# Patient Record
Sex: FEMALE | Race: BLACK OR AFRICAN AMERICAN
[De-identification: names, ages, dates, MRNs, and addresses within clinical notes are randomized per-mention and may not be internally consistent; named-entity substitution may affect disease eponyms.]

---

## 2020-10-16 ENCOUNTER — ERX REFILL RESPONSE (OUTPATIENT)
Age: 40
End: 2020-10-16

## 2020-10-16 RX ORDER — OMEPRAZOLE 40 MG/1
TAKE ONE CAPSULE BY MOUTH TWICE A DAY 30 MINUTES BEFORE FIRST AND LAST MEALS OF THE DAY CAPSULE, DELAYED RELEASE ORAL
Qty: 60 | Refills: 0

## 2020-11-17 ENCOUNTER — OFFICE VISIT (OUTPATIENT)
Dept: URBAN - METROPOLITAN AREA TELEHEALTH 2 | Facility: TELEHEALTH | Age: 40
End: 2020-11-17
Payer: SELF-PAY

## 2020-11-17 DIAGNOSIS — Z90.49 S/P CHOLECYSTECTOMY: ICD-10-CM

## 2020-11-17 DIAGNOSIS — K21.9 GERD: ICD-10-CM

## 2020-11-17 DIAGNOSIS — A04.8 H. PYLORI INFECTION: ICD-10-CM

## 2020-11-17 DIAGNOSIS — K76.0 FATTY LIVER: ICD-10-CM

## 2020-11-17 DIAGNOSIS — K31.84 GASTROPARESIS: ICD-10-CM

## 2020-11-17 DIAGNOSIS — Z98.84 STATUS POST GASTRIC BYPASS: ICD-10-CM

## 2020-11-17 PROCEDURE — 83013 H PYLORI (C-13) BREATH: CPT | Performed by: INTERNAL MEDICINE

## 2020-11-17 PROCEDURE — 83014 H PYLORI DRUG ADMIN: CPT | Performed by: INTERNAL MEDICINE

## 2020-11-17 PROCEDURE — 99214 OFFICE O/P EST MOD 30 MIN: CPT | Performed by: INTERNAL MEDICINE

## 2020-11-17 RX ORDER — OMEPRAZOLE 40 MG/1
TAKE ONE CAPSULE BY MOUTH TWICE A DAY 30 MINUTES BEFORE FIRST AND LAST MEALS OF THE DAY CAPSULE, DELAYED RELEASE ORAL
Qty: 60 | Refills: 0 | Status: ACTIVE | COMMUNITY

## 2020-11-17 RX ORDER — METRONIDAZOLE 500 MG/1
TAKE 1 TABLET (500 MG) BY ORAL ROUTE 3 TIMES PER DAY TABLET ORAL
Qty: 42 | Refills: 0 | Status: ACTIVE | COMMUNITY
Start: 2019-09-16 | End: 1900-01-01

## 2020-11-17 NOTE — HPI-OTHER HISTORIES
wt decr 43# over 1y since gastric bypass (191 total)  Rx'ed PPI/Cipro/Flagyl for Hpylori last yr  Epig pain less freq / severe  no further N/V, dyspepsia, dysphagia  Constipation alt w diarrhea - minor - usually regular and predictable  reviewed EGD from 2019, Colon from 2017

## 2021-08-30 ENCOUNTER — OFFICE VISIT (OUTPATIENT)
Dept: URBAN - METROPOLITAN AREA CLINIC 92 | Facility: CLINIC | Age: 41
End: 2021-08-30
Payer: SELF-PAY

## 2021-08-30 DIAGNOSIS — K21.9 GERD: ICD-10-CM

## 2021-08-30 DIAGNOSIS — Z90.49 S/P CHOLECYSTECTOMY: ICD-10-CM

## 2021-08-30 DIAGNOSIS — K31.84 GASTROPARESIS: ICD-10-CM

## 2021-08-30 DIAGNOSIS — Z98.84 STATUS POST GASTRIC BYPASS: ICD-10-CM

## 2021-08-30 DIAGNOSIS — K76.0 FATTY LIVER: ICD-10-CM

## 2021-08-30 DIAGNOSIS — A04.8 H. PYLORI INFECTION: ICD-10-CM

## 2021-08-30 PROCEDURE — 99214 OFFICE O/P EST MOD 30 MIN: CPT | Performed by: INTERNAL MEDICINE

## 2021-08-30 RX ORDER — OMEPRAZOLE 40 MG/1
TAKE ONE CAPSULE BY MOUTH TWICE A DAY 30 MINUTES BEFORE FIRST AND LAST MEALS OF THE DAY CAPSULE, DELAYED RELEASE ORAL TWICE DAILY
Qty: 180 | Refills: 3

## 2021-08-30 RX ORDER — OMEPRAZOLE 40 MG/1
TAKE ONE CAPSULE BY MOUTH TWICE A DAY 30 MINUTES BEFORE FIRST AND LAST MEALS OF THE DAY CAPSULE, DELAYED RELEASE ORAL
Qty: 60 | Refills: 0 | Status: ACTIVE | COMMUNITY

## 2021-08-30 RX ORDER — POLYETHYLENE GLYCOL 3350 17 G/17G
1 PACKET MIXED WITH 8 OUNCES OF FLUID POWDER, FOR SOLUTION ORAL TWICE A DAY
Qty: 180 | Refills: 3 | OUTPATIENT

## 2021-08-30 NOTE — HPI-OTHER HISTORIES
wt decr 4# over 9mo (was 159)  now w recurrent abd pain  burning, epigastric  Rx'ed PPI/Cipro/Flagyl for Hpylori 2y ago  no further N/V, dyspepsia, dysphagia  Constipation recurrence - can skip days w/o BM  reviewed EGD from 2019, Colon from 2017  recent partial hysterectomy c/b hematoma formation

## 2021-10-26 ENCOUNTER — OFFICE VISIT (OUTPATIENT)
Dept: URBAN - METROPOLITAN AREA CLINIC 92 | Facility: CLINIC | Age: 41
End: 2021-10-26

## 2021-11-17 LAB
ALBUMIN: 4.3
ALKALINE PHOSPHATASE: 67
ALT (SGPT): 8
AST (SGOT): 6
BILIRUBIN, DIRECT: 0.13
BILIRUBIN, TOTAL: 0.4
PROTEIN, TOTAL: 7.3

## 2021-11-19 ENCOUNTER — OFFICE VISIT (OUTPATIENT)
Dept: URBAN - METROPOLITAN AREA TELEHEALTH 2 | Facility: TELEHEALTH | Age: 41
End: 2021-11-19
Payer: SELF-PAY

## 2021-11-19 DIAGNOSIS — R63.4 WEIGHT LOSS: ICD-10-CM

## 2021-11-19 DIAGNOSIS — K21.9 GERD: ICD-10-CM

## 2021-11-19 DIAGNOSIS — A04.8 H. PYLORI INFECTION: ICD-10-CM

## 2021-11-19 DIAGNOSIS — K76.0 FATTY LIVER: ICD-10-CM

## 2021-11-19 DIAGNOSIS — K31.84 GASTROPARESIS: ICD-10-CM

## 2021-11-19 DIAGNOSIS — Z90.49 S/P CHOLECYSTECTOMY: ICD-10-CM

## 2021-11-19 PROCEDURE — 99214 OFFICE O/P EST MOD 30 MIN: CPT | Performed by: INTERNAL MEDICINE

## 2021-11-19 RX ORDER — OMEPRAZOLE 40 MG/1
TAKE ONE CAPSULE BY MOUTH TWICE A DAY 30 MINUTES BEFORE FIRST AND LAST MEALS OF THE DAY CAPSULE, DELAYED RELEASE ORAL TWICE DAILY
Qty: 180 | Refills: 3 | COMMUNITY

## 2021-11-19 RX ORDER — POLYETHYLENE GLYCOL 3350 17 G/17G
1 PACKET MIXED WITH 8 OUNCES OF FLUID POWDER, FOR SOLUTION ORAL TWICE A DAY
Qty: 180 | Refills: 3 | COMMUNITY

## 2021-11-19 RX ORDER — POLYETHYLENE GLYCOL 3350 17 G/17G
1 PACKET MIXED WITH 8 OUNCES OF FLUID POWDER, FOR SOLUTION ORAL TWICE A DAY
Qty: 180 | Refills: 3 | OUTPATIENT

## 2021-11-19 RX ORDER — OMEPRAZOLE 40 MG/1
TAKE ONE CAPSULE BY MOUTH TWICE A DAY 30 MINUTES BEFORE FIRST AND LAST MEALS OF THE DAY CAPSULE, DELAYED RELEASE ORAL TWICE DAILY
Qty: 180 | Refills: 3

## 2021-11-19 NOTE — HPI-OTHER HISTORIES
wt decr 10# over 3mo (was 155)  s/p gastric bypass surgery  RUQ US not done  seen in ER last wk, CT scan normal per report  now w recurrent abd pain  burning, epigastric, severe  Rx'ed PPI/Cipro/Flagyl for Hpylori 2y ago  no further N/V, dyspepsia, dysphagia  Constipation recurrence - can skip days w/o BM  reviewed EGD from 2019, Colon from 2017  recent partial hysterectomy c/b hematoma formation

## 2021-11-22 ENCOUNTER — CLAIMS CREATED FROM THE CLAIM WINDOW (OUTPATIENT)
Dept: URBAN - METROPOLITAN AREA CLINIC 4 | Facility: CLINIC | Age: 41
End: 2021-11-22
Payer: SELF-PAY

## 2021-11-22 ENCOUNTER — OFFICE VISIT (OUTPATIENT)
Dept: URBAN - METROPOLITAN AREA SURGERY CENTER 16 | Facility: SURGERY CENTER | Age: 41
End: 2021-11-22
Payer: SELF-PAY

## 2021-11-22 DIAGNOSIS — K29.60 ADENOPAPILLOMATOSIS GASTRICA: ICD-10-CM

## 2021-11-22 DIAGNOSIS — B96.81 BACTERIAL INFECTION DUE TO H. PYLORI: ICD-10-CM

## 2021-11-22 DIAGNOSIS — Z98.84 BARIATRIC SURG STAT-UNSP: ICD-10-CM

## 2021-11-22 DIAGNOSIS — B96.81 HELICOBACTER PYLORI [H. PYLORI] AS THE CAUSE OF DISEASES CLASSIFIED ELSEWHERE: ICD-10-CM

## 2021-11-22 DIAGNOSIS — K29.60 OTHER GASTRITIS WITHOUT BLEEDING: ICD-10-CM

## 2021-11-22 PROCEDURE — G8907 PT DOC NO EVENTS ON DISCHARG: HCPCS | Performed by: INTERNAL MEDICINE

## 2021-11-22 PROCEDURE — 88305 TISSUE EXAM BY PATHOLOGIST: CPT | Performed by: PATHOLOGY

## 2021-11-22 PROCEDURE — 88312 SPECIAL STAINS GROUP 1: CPT | Performed by: PATHOLOGY

## 2021-11-22 PROCEDURE — 43239 EGD BIOPSY SINGLE/MULTIPLE: CPT | Performed by: INTERNAL MEDICINE

## 2021-11-22 RX ORDER — OMEPRAZOLE 40 MG/1
TAKE ONE CAPSULE BY MOUTH TWICE A DAY 30 MINUTES BEFORE FIRST AND LAST MEALS OF THE DAY CAPSULE, DELAYED RELEASE ORAL TWICE DAILY
Qty: 180 | Refills: 3 | Status: ACTIVE | COMMUNITY

## 2021-11-22 RX ORDER — POLYETHYLENE GLYCOL 3350 17 G/17G
1 PACKET MIXED WITH 8 OUNCES OF FLUID POWDER, FOR SOLUTION ORAL TWICE A DAY
Qty: 180 | Refills: 3 | Status: ACTIVE | COMMUNITY

## 2021-12-08 PROBLEM — 442461003 HISTORY OF EXCISION OF INTESTINAL STRUCTURE: Status: ACTIVE | Noted: 2020-11-17

## 2021-12-08 PROBLEM — 91936005 PENICILLIN ALLERGY: Status: ACTIVE | Noted: 2021-12-08

## 2021-12-08 PROBLEM — 235675006 GASTROPARESIS: Status: ACTIVE | Noted: 2020-11-17

## 2021-12-08 PROBLEM — 197321007 FATTY LIVER: Status: ACTIVE | Noted: 2020-11-17

## 2021-12-10 ENCOUNTER — OFFICE VISIT (OUTPATIENT)
Dept: URBAN - METROPOLITAN AREA TELEHEALTH 2 | Facility: TELEHEALTH | Age: 41
End: 2021-12-10
Payer: SELF-PAY

## 2021-12-10 DIAGNOSIS — A04.8 H. PYLORI INFECTION: ICD-10-CM

## 2021-12-10 DIAGNOSIS — K21.9 GERD: ICD-10-CM

## 2021-12-10 DIAGNOSIS — K31.84 GASTROPARESIS: ICD-10-CM

## 2021-12-10 DIAGNOSIS — Z98.84 STATUS POST GASTRIC BYPASS: ICD-10-CM

## 2021-12-10 PROCEDURE — 99214 OFFICE O/P EST MOD 30 MIN: CPT | Performed by: INTERNAL MEDICINE

## 2021-12-10 RX ORDER — POLYETHYLENE GLYCOL 3350 17 G/17G
1 PACKET MIXED WITH 8 OUNCES OF FLUID POWDER, FOR SOLUTION ORAL TWICE A DAY
Qty: 180 | Refills: 3 | OUTPATIENT

## 2021-12-10 RX ORDER — OMEPRAZOLE 40 MG/1
TAKE ONE CAPSULE BY MOUTH TWICE A DAY 30 MINUTES BEFORE FIRST AND LAST MEALS OF THE DAY CAPSULE, DELAYED RELEASE ORAL TWICE DAILY
Qty: 180 | Refills: 3 | COMMUNITY

## 2021-12-10 RX ORDER — POLYETHYLENE GLYCOL 3350 17 G/17G
1 PACKET MIXED WITH 8 OUNCES OF FLUID POWDER, FOR SOLUTION ORAL TWICE A DAY
Qty: 180 | Refills: 3 | COMMUNITY

## 2021-12-10 RX ORDER — OMEPRAZOLE 40 MG/1
TAKE ONE CAPSULE BY MOUTH TWICE A DAY 30 MINUTES BEFORE FIRST AND LAST MEALS OF THE DAY CAPSULE, DELAYED RELEASE ORAL TWICE DAILY
Qty: 180 | Refills: 3

## 2021-12-10 NOTE — HPI-OTHER HISTORIES
wt stable# over 3wk (was 145)  11/22/21 EGD anatomy c/w s/p gastric bypass surgery bxs POSITIVE for hpylori gastritis  CT scan normal recently  on PPI/Miralax,   abd pain persistent; burning, epigastric, severe; has to be on a soft/vegan diet  no further N/V, dyspepsia, dysphagia  Constipation recurrence - can skip days w/o BM  recent partial hysterectomy c/b hematoma formation

## 2021-12-10 NOTE — PHYSICAL EXAM NECK/THYROID:
normal appearance, without tenderness upon palpation, no deformities, no cervical lymphadenopathy, no masses, no thyroid nodules, Thyroid normal size, no JVD, thyroid nontender
97

## 2021-12-22 ENCOUNTER — TELEPHONE ENCOUNTER (OUTPATIENT)
Dept: URBAN - METROPOLITAN AREA CLINIC 92 | Facility: CLINIC | Age: 41
End: 2021-12-22

## 2021-12-22 RX ORDER — POLYETHYLENE GLYCOL 3350 17 G/17G
1 PACKET MIXED WITH 8 OUNCES OF FLUID POWDER, FOR SOLUTION ORAL TWICE A DAY
Qty: 180 | Refills: 3 | Status: ACTIVE | COMMUNITY

## 2021-12-22 RX ORDER — OMEPRAZOLE 40 MG/1
TAKE ONE CAPSULE BY MOUTH TWICE A DAY 30 MINUTES BEFORE FIRST AND LAST MEALS OF THE DAY CAPSULE, DELAYED RELEASE ORAL TWICE DAILY
Qty: 180 | Refills: 3 | Status: ACTIVE | COMMUNITY

## 2021-12-22 RX ORDER — ONDANSETRON HYDROCHLORIDE 8 MG/1
1 TABLET AS NEEDED TABLET, FILM COATED ORAL
Qty: 90 | Refills: 1 | OUTPATIENT
Start: 2021-12-22

## 2023-07-14 ENCOUNTER — LAB OUTSIDE AN ENCOUNTER (OUTPATIENT)
Dept: URBAN - METROPOLITAN AREA TELEHEALTH 2 | Facility: TELEHEALTH | Age: 43
End: 2023-07-14

## 2023-07-14 ENCOUNTER — OFFICE VISIT (OUTPATIENT)
Dept: URBAN - METROPOLITAN AREA TELEHEALTH 2 | Facility: TELEHEALTH | Age: 43
End: 2023-07-14
Payer: SELF-PAY

## 2023-07-14 ENCOUNTER — TELEPHONE ENCOUNTER (OUTPATIENT)
Dept: URBAN - METROPOLITAN AREA CLINIC 92 | Facility: CLINIC | Age: 43
End: 2023-07-14

## 2023-07-14 VITALS — WEIGHT: 145 LBS | HEIGHT: 60 IN | BODY MASS INDEX: 28.47 KG/M2

## 2023-07-14 DIAGNOSIS — K76.0 FATTY LIVER: ICD-10-CM

## 2023-07-14 DIAGNOSIS — K21.9 GERD: ICD-10-CM

## 2023-07-14 DIAGNOSIS — K31.84 GASTROPARESIS: ICD-10-CM

## 2023-07-14 DIAGNOSIS — A04.8 BACTERIAL INFECTION DUE TO H. PYLORI: ICD-10-CM

## 2023-07-14 PROCEDURE — 99214 OFFICE O/P EST MOD 30 MIN: CPT | Performed by: INTERNAL MEDICINE

## 2023-07-14 RX ORDER — OMEPRAZOLE 40 MG/1
TAKE ONE CAPSULE BY MOUTH TWICE A DAY 30 MINUTES BEFORE FIRST AND LAST MEALS OF THE DAY CAPSULE, DELAYED RELEASE ORAL TWICE DAILY
Qty: 180 | Refills: 3

## 2023-07-14 RX ORDER — ONDANSETRON HYDROCHLORIDE 8 MG/1
1 TABLET AS NEEDED TABLET, FILM COATED ORAL
Qty: 90 | Refills: 1 | Status: ACTIVE | COMMUNITY
Start: 2021-12-22

## 2023-07-14 RX ORDER — OMEPRAZOLE 40 MG/1
TAKE ONE CAPSULE BY MOUTH TWICE A DAY 30 MINUTES BEFORE FIRST AND LAST MEALS OF THE DAY CAPSULE, DELAYED RELEASE ORAL TWICE DAILY
Qty: 180 | Refills: 3 | Status: ACTIVE | COMMUNITY

## 2023-07-14 RX ORDER — POLYETHYLENE GLYCOL 3350 17 G/17G
1 PACKET MIXED WITH 8 OUNCES OF FLUID POWDER, FOR SOLUTION ORAL TWICE A DAY
Qty: 180 | Refills: 3 | Status: ACTIVE | COMMUNITY

## 2023-07-14 NOTE — HPI-OTHER HISTORIES
42yoF, former patient of Dr. Strong, presents for fu, last seen 2021 She notes severe GERD. With all po intake has regurgitation and burning up her throat. Assoc nausea and gagging but no vomiting-she is s/p bypass.  She is currently on omeprazole without improvement in sx. Denies NSAID use. No dysphagia.  She has BMs every 1-2 days with intermittent diarrhea about 1-2/week for the last month. No hematochezia or melena. She denies dairy intake.   11/22/21 EGD: anatomy c/w s/p gastric bypass surgery, bxs POSITIVE for hpylori gastritis, + grade a esoph Colon 11/2016 WNL  GES 2018 slow emptying at 2hrs, normal at 3  Referred to [facility] Dr. Lemus who rec quad therapy as prior failure to cipro, flagyl, and a PPI (PCN allergy). F/U eradication testing ordered but no results in [facility]. Per patient ID told her that they failed to eradicate the bacteria and then she never followed-up.

## 2023-07-27 ENCOUNTER — OFFICE VISIT (OUTPATIENT)
Dept: URBAN - METROPOLITAN AREA SURGERY CENTER 16 | Facility: SURGERY CENTER | Age: 43
End: 2023-07-27
Payer: SELF-PAY

## 2023-07-27 ENCOUNTER — CLAIMS CREATED FROM THE CLAIM WINDOW (OUTPATIENT)
Dept: URBAN - METROPOLITAN AREA CLINIC 4 | Facility: CLINIC | Age: 43
End: 2023-07-27
Payer: SELF-PAY

## 2023-07-27 DIAGNOSIS — K31.89 OTHER DISEASES OF STOMACH AND DUODENUM: ICD-10-CM

## 2023-07-27 DIAGNOSIS — K31.89 ACQUIRED DEFORMITY OF DUODENUM: ICD-10-CM

## 2023-07-27 DIAGNOSIS — Z98.84 BARIATRIC SURG STAT-UNSP: ICD-10-CM

## 2023-07-27 DIAGNOSIS — R11.2 ACUTE NAUSEA WITH NONBILIOUS VOMITING: ICD-10-CM

## 2023-07-27 PROCEDURE — 88305 TISSUE EXAM BY PATHOLOGIST: CPT | Performed by: PATHOLOGY

## 2023-07-27 PROCEDURE — 43239 EGD BIOPSY SINGLE/MULTIPLE: CPT | Performed by: INTERNAL MEDICINE

## 2023-07-27 PROCEDURE — 88342 IMHCHEM/IMCYTCHM 1ST ANTB: CPT | Performed by: PATHOLOGY

## 2023-08-30 ENCOUNTER — DASHBOARD ENCOUNTERS (OUTPATIENT)
Age: 43
End: 2023-08-30

## 2023-08-31 ENCOUNTER — CLAIMS CREATED FROM THE CLAIM WINDOW (OUTPATIENT)
Dept: URBAN - METROPOLITAN AREA TELEHEALTH 2 | Facility: TELEHEALTH | Age: 43
End: 2023-08-31
Payer: SELF-PAY

## 2023-08-31 ENCOUNTER — TELEPHONE ENCOUNTER (OUTPATIENT)
Dept: URBAN - METROPOLITAN AREA CLINIC 92 | Facility: CLINIC | Age: 43
End: 2023-08-31

## 2023-08-31 VITALS — WEIGHT: 145 LBS | HEIGHT: 60 IN | BODY MASS INDEX: 28.47 KG/M2

## 2023-08-31 DIAGNOSIS — R19.5 LOOSE STOOLS: ICD-10-CM

## 2023-08-31 DIAGNOSIS — K21.9 GERD: ICD-10-CM

## 2023-08-31 DIAGNOSIS — Z98.84 STATUS POST GASTRIC BYPASS: ICD-10-CM

## 2023-08-31 DIAGNOSIS — Z90.49 S/P CHOLECYSTECTOMY: ICD-10-CM

## 2023-08-31 DIAGNOSIS — K31.84 GASTROPARESIS: ICD-10-CM

## 2023-08-31 DIAGNOSIS — K59.09 CHRONIC CONSTIPATION: ICD-10-CM

## 2023-08-31 DIAGNOSIS — Z88.0 PENICILLIN ALLERGY: ICD-10-CM

## 2023-08-31 DIAGNOSIS — A04.8 H. PYLORI INFECTION: ICD-10-CM

## 2023-08-31 DIAGNOSIS — K76.0 FATTY LIVER: ICD-10-CM

## 2023-08-31 PROCEDURE — 99213 OFFICE O/P EST LOW 20 MIN: CPT | Performed by: PHYSICIAN ASSISTANT

## 2023-08-31 RX ORDER — ONDANSETRON HYDROCHLORIDE 8 MG/1
1 TABLET AS NEEDED TABLET, FILM COATED ORAL
Qty: 90 | Refills: 1 | Status: ACTIVE | COMMUNITY
Start: 2021-12-22

## 2023-08-31 RX ORDER — OMEPRAZOLE 40 MG/1
TAKE ONE CAPSULE BY MOUTH TWICE A DAY 30 MINUTES BEFORE FIRST AND LAST MEALS OF THE DAY CAPSULE, DELAYED RELEASE ORAL TWICE DAILY
Qty: 180 | Refills: 3 | Status: ACTIVE | COMMUNITY

## 2023-08-31 RX ORDER — POLYETHYLENE GLYCOL 3350 17 G/17G
1 PACKET MIXED WITH 8 OUNCES OF FLUID POWDER, FOR SOLUTION ORAL TWICE A DAY
Qty: 180 | Refills: 3 | Status: ACTIVE | COMMUNITY

## 2023-08-31 RX ORDER — OMEPRAZOLE 40 MG/1
TAKE ONE CAPSULE BY MOUTH TWICE A DAY 30 MINUTES BEFORE FIRST AND LAST MEALS OF THE DAY CAPSULE, DELAYED RELEASE ORAL TWICE DAILY
Qty: 180 | Refills: 3

## 2023-08-31 RX ORDER — PRUCALOPRIDE 2 MG/1
1 TABLET TABLET, FILM COATED ORAL ONCE A DAY
Qty: 90 TABLET | Refills: 3 | OUTPATIENT
Start: 2023-08-31 | End: 2024-08-25

## 2023-08-31 NOTE — HPI-OTHER HISTORIES
42yoF, former patient of Dr. Strong, presents for fu. She was seen recently with severe GERD, despite AM omeprazole, with all po intake. Noted regurgitation and burning up her throat. Assoc nausea and gagging but no vomiting-she is s/p bypass. No NSAID use or dysphagia. EGD 11/2021 anatomy c/w s/p gastric bypass surgery, bxs + for hpylori gastritis, + grade a esoph. Referred to [facility] Dr. Lemus who rec quad therapy as prior failure to cipro, flagyl, and a PPI (PCN allergy) and she completed this.  EGD 7/2023 showed s/p bypass with normal pouch and healthy anastamosis, bx with ischemic changes but negative for HP. ON PPI reflux improved, now with mild burning in throat but no further regurg, nausea or gagging. She is not adhering to gastropresis diet and GES 2018 slow emptying at 2hrs, normal at 3.   She notes constipation X 3 weeks. Notes BMs harder than normal and less often, about every 2-3 days. Used to be on miralax daily but "ran out." Using prn laxatives with improvment. No hematochezia or melena. Water intake low but F&V intake is good. Denies hematochezia or melena.   Colon 11/2016 WNL

## 2024-09-10 ENCOUNTER — LAB OUTSIDE AN ENCOUNTER (OUTPATIENT)
Dept: URBAN - METROPOLITAN AREA CLINIC 94 | Facility: CLINIC | Age: 44
End: 2024-09-10

## 2024-09-10 ENCOUNTER — OFFICE VISIT (OUTPATIENT)
Dept: URBAN - METROPOLITAN AREA CLINIC 94 | Facility: CLINIC | Age: 44
End: 2024-09-10
Payer: COMMERCIAL

## 2024-09-10 VITALS
TEMPERATURE: 98.1 F | WEIGHT: 248.2 LBS | HEIGHT: 60 IN | HEART RATE: 78 BPM | DIASTOLIC BLOOD PRESSURE: 81 MMHG | OXYGEN SATURATION: 94 % | BODY MASS INDEX: 48.73 KG/M2 | SYSTOLIC BLOOD PRESSURE: 129 MMHG

## 2024-09-10 DIAGNOSIS — E16.2 HYPOGLYCEMIA, UNSPECIFIED: ICD-10-CM

## 2024-09-10 DIAGNOSIS — Z12.11 COLON CANCER SCREENING: ICD-10-CM

## 2024-09-10 DIAGNOSIS — Z98.84 HISTORY OF GASTRIC BYPASS: ICD-10-CM

## 2024-09-10 PROBLEM — 302866003: Status: ACTIVE | Noted: 2024-09-10

## 2024-09-10 PROCEDURE — 99214 OFFICE O/P EST MOD 30 MIN: CPT | Performed by: INTERNAL MEDICINE

## 2024-09-10 RX ORDER — AZELASTINE HYDROCHLORIDE 137 UG/1
SPRAY ONE SPRAY IN EACH NOSTRIL TWICE DAILY SPRAY, METERED NASAL
Qty: 30 UNSPECIFIED | Refills: 0 | Status: ACTIVE | COMMUNITY

## 2024-09-10 RX ORDER — LAMOTRIGINE 200 MG/1
TAKE ONE TABLET BY MOUTH ONE TIME DAILY TABLET ORAL
Qty: 90 UNSPECIFIED | Refills: 0 | Status: ACTIVE | COMMUNITY

## 2024-09-10 RX ORDER — MONTELUKAST SODIUM 10 MG/1
TAKE ONE TABLET BY MOUTH ONE TIME DAILY TABLET, COATED ORAL
Qty: 90 UNSPECIFIED | Refills: 0 | Status: ACTIVE | COMMUNITY

## 2024-09-10 RX ORDER — BUSPIRONE HYDROCHLORIDE 15 MG/1
TAKE ONE TABLET BY MOUTH EVERY 6 TO 8 HOURS AS NEEDED FOR ANXIETY TABLET ORAL
Qty: 60 UNSPECIFIED | Refills: 1 | Status: ACTIVE | COMMUNITY

## 2024-09-10 RX ORDER — ONDANSETRON 4 MG/1
1 TABLET ON THE TONGUE AND ALLOW TO DISSOLVE TABLET, ORALLY DISINTEGRATING ORAL
Qty: 5 TABLET | Refills: 0 | OUTPATIENT
Start: 2024-09-10

## 2024-09-10 RX ORDER — SUMATRIPTAN SUCCINATE 100 MG/1
TAKE ONE TABLET BY MOUTH AT ONSET OF HEADACHE. MAY REPEAT DOSE WITH ONE TABLET IN TWO HOURS IF NEEDED. DO NOT EXCEED TWO TABLETS IN 24 HOURS TABLET ORAL
Qty: 4 UNSPECIFIED | Refills: 3 | Status: ACTIVE | COMMUNITY

## 2024-09-10 RX ORDER — HYDROXYZINE HYDROCHLORIDE 50 MG/1
TAKE ONE TABLET BY MOUTH THREE TIMES A DAY AS NEEDED FOR ITCHING OR ANXIETY TABLET ORAL
Qty: 90 UNSPECIFIED | Refills: 0 | Status: ACTIVE | COMMUNITY

## 2024-09-10 RX ORDER — SEMAGLUTIDE 0.68 MG/ML
AS DIRECTED INJECTION, SOLUTION SUBCUTANEOUS
Status: ACTIVE | COMMUNITY

## 2024-09-10 RX ORDER — ONDANSETRON HYDROCHLORIDE 8 MG/1
1 TABLET AS NEEDED TABLET, FILM COATED ORAL
Qty: 90 | Refills: 1 | Status: ACTIVE | COMMUNITY
Start: 2021-12-22

## 2024-09-10 RX ORDER — POLYETHYLENE GLYCOL 3350 17 G/17G
1 PACKET MIXED WITH 8 OUNCES OF FLUID POWDER, FOR SOLUTION ORAL TWICE A DAY
Qty: 180 | Refills: 3 | Status: ON HOLD | COMMUNITY

## 2024-09-10 RX ORDER — SODIUM, POTASSIUM,MAG SULFATES 17.5-3.13G
AS DIRECTED SOLUTION, RECONSTITUTED, ORAL ORAL
Qty: 1 | Refills: 0 | OUTPATIENT
Start: 2024-09-10 | End: 2024-09-12

## 2024-09-10 RX ORDER — FLUOXETINE HYDROCHLORIDE 10 MG/1
TAKE ONE CAPSULE BY MOUTH ONE TIME DAILY FOR 30 DAYS CAPSULE ORAL
Qty: 30 UNSPECIFIED | Refills: 0 | Status: ACTIVE | COMMUNITY

## 2024-09-10 RX ORDER — RIZATRIPTAN BENZOATE 10 MG/1
TAKE ONE TABLET BY MOUTH ONE TIME DAILY AS NEEDED FOR FOR HEADACHE MAXIMUM 2 TABLETS DAILY TABLET ORAL
Qty: 9 UNSPECIFIED | Refills: 0 | Status: ACTIVE | COMMUNITY

## 2024-09-10 RX ORDER — TRAMADOL HYDROCHLORIDE 50 MG/1
TAKE ONE TABLET BY MOUTH EVERY 12 HOURS AS NEEDED FOR PAIN FOR 14 DAYS TABLET, COATED ORAL
Qty: 28 UNSPECIFIED | Refills: 0 | Status: ACTIVE | COMMUNITY

## 2024-09-10 RX ORDER — ALBUTEROL SULFATE 2.5 MG/3ML
INHALE ONE VIAL VIA NEBULIZER EVERY 4 TO 6 HOURS AS NEEDED SOLUTION RESPIRATORY (INHALATION)
Qty: 90 UNSPECIFIED | Refills: 0 | Status: ACTIVE | COMMUNITY

## 2024-09-10 RX ORDER — SCOPOLAMINE 1.5 MG/1
APPLY ONE PATCH TO THE SKIN BEHIND THE EAR EVERY 72 HOURS. REMOVE OLD PATCH BEFORE APPLYING NEW PATCH PATCH, EXTENDED RELEASE TRANSDERMAL
Qty: 10 UNSPECIFIED | Refills: 0 | Status: ACTIVE | COMMUNITY

## 2024-09-10 RX ORDER — OMEPRAZOLE 40 MG/1
TAKE ONE CAPSULE BY MOUTH TWICE A DAY 30 MINUTES BEFORE FIRST AND LAST MEALS OF THE DAY CAPSULE, DELAYED RELEASE ORAL TWICE DAILY
Qty: 180 | Refills: 3 | Status: ACTIVE | COMMUNITY

## 2024-09-10 NOTE — HPI-TODAY'S VISIT:
Ms. Cummings presents today upon referral from Dr. Mayra Weinstein for screening colonoscopy.  The patient reports a history of gastric bypass surgery with successful weight loss until the need for knee replacement surgery.  She has regained some of the inital loss.  She is now working to lose and has been placed on Ozempic.  She states she also has hypoglycemia which she combats with carbs.  Ms. Cummings denies any rectal pain, rectal bleeding, abdominal pain, hematochezia, melena, unintentional weight loss, anemia, change in bowel habits.  She reports longstanding history of consitpation since her by-pass surgery.  She denies diarrhea.  She reports some day nausea relieved with Zofran. She denies a family history of colon cancer or colon polyps.  The patient reports a history of recurrent h pylori infection for which she was followed by Dr. Strong in our [place] [facility].  Last EGD in 7/23, there was no h pylori indicated. Dr. Strong has since retired and she was recommended here.

## 2024-11-04 ENCOUNTER — OFFICE VISIT (OUTPATIENT)
Dept: URBAN - METROPOLITAN AREA SURGERY CENTER 17 | Facility: SURGERY CENTER | Age: 44
End: 2024-11-04

## 2024-12-10 ENCOUNTER — WEB ENCOUNTER (OUTPATIENT)
Dept: URBAN - METROPOLITAN AREA CLINIC 94 | Facility: CLINIC | Age: 44
End: 2024-12-10

## 2024-12-10 RX ORDER — OMEPRAZOLE 40 MG/1
TAKE ONE CAPSULE BY MOUTH TWICE A DAY 30 MINUTES BEFORE FIRST AND LAST MEALS OF THE DAY CAPSULE, DELAYED RELEASE ORAL TWICE DAILY
Qty: 180 | Refills: 1

## 2024-12-13 ENCOUNTER — TELEPHONE ENCOUNTER (OUTPATIENT)
Dept: URBAN - METROPOLITAN AREA CLINIC 94 | Facility: CLINIC | Age: 44
End: 2024-12-13

## 2024-12-16 ENCOUNTER — TELEPHONE ENCOUNTER (OUTPATIENT)
Dept: URBAN - METROPOLITAN AREA CLINIC 94 | Facility: CLINIC | Age: 44
End: 2024-12-16

## 2024-12-23 ENCOUNTER — TELEPHONE ENCOUNTER (OUTPATIENT)
Dept: URBAN - METROPOLITAN AREA CLINIC 94 | Facility: CLINIC | Age: 44
End: 2024-12-23

## 2025-01-02 ENCOUNTER — OFFICE VISIT (OUTPATIENT)
Dept: URBAN - METROPOLITAN AREA SURGERY CENTER 17 | Facility: SURGERY CENTER | Age: 45
End: 2025-01-02

## 2025-01-14 ENCOUNTER — OFFICE VISIT (OUTPATIENT)
Dept: URBAN - METROPOLITAN AREA CLINIC 94 | Facility: CLINIC | Age: 45
End: 2025-01-14

## 2025-01-22 ENCOUNTER — OFFICE VISIT (OUTPATIENT)
Dept: URBAN - METROPOLITAN AREA CLINIC 94 | Facility: CLINIC | Age: 45
End: 2025-01-22

## 2025-02-28 ENCOUNTER — OFFICE VISIT (OUTPATIENT)
Dept: URBAN - METROPOLITAN AREA CLINIC 94 | Facility: CLINIC | Age: 45
End: 2025-02-28

## 2025-06-25 ENCOUNTER — LAB OUTSIDE AN ENCOUNTER (OUTPATIENT)
Dept: URBAN - METROPOLITAN AREA CLINIC 17 | Facility: CLINIC | Age: 45
End: 2025-06-25

## 2025-06-25 ENCOUNTER — OFFICE VISIT (OUTPATIENT)
Dept: URBAN - METROPOLITAN AREA CLINIC 17 | Facility: CLINIC | Age: 45
End: 2025-06-25
Payer: COMMERCIAL

## 2025-06-25 DIAGNOSIS — K90.9 STEATORRHEA: ICD-10-CM

## 2025-06-25 DIAGNOSIS — E46 PROTEIN-CALORIE MALNUTRITION, UNSPECIFIED SEVERITY: ICD-10-CM

## 2025-06-25 DIAGNOSIS — E16.2 HYPOGLYCEMIA, UNSPECIFIED: ICD-10-CM

## 2025-06-25 DIAGNOSIS — Z98.84 HISTORY OF GASTRIC BYPASS: ICD-10-CM

## 2025-06-25 DIAGNOSIS — Z12.11 COLON CANCER SCREENING: ICD-10-CM

## 2025-06-25 DIAGNOSIS — R82.998 DARK BROWN URINE: ICD-10-CM

## 2025-06-25 DIAGNOSIS — R60.0 BILATERAL LOWER EXTREMITY EDEMA: ICD-10-CM

## 2025-06-25 DIAGNOSIS — R17 TOTAL BILIRUBIN, ELEVATED: ICD-10-CM

## 2025-06-25 DIAGNOSIS — R74.8 ELEVATED ALKALINE PHOSPHATASE LEVEL: ICD-10-CM

## 2025-06-25 PROBLEM — 66187002: Status: ACTIVE | Noted: 2025-06-25

## 2025-06-25 PROBLEM — 238107002: Status: ACTIVE | Noted: 2025-06-25

## 2025-06-25 PROCEDURE — 99214 OFFICE O/P EST MOD 30 MIN: CPT

## 2025-06-25 RX ORDER — TRAMADOL HYDROCHLORIDE 50 MG/1
TAKE ONE TABLET BY MOUTH EVERY 12 HOURS AS NEEDED FOR PAIN FOR 14 DAYS TABLET, COATED ORAL
Qty: 28 UNSPECIFIED | Refills: 0 | Status: ACTIVE | COMMUNITY

## 2025-06-25 RX ORDER — OMEPRAZOLE 40 MG/1
TAKE ONE CAPSULE BY MOUTH TWICE A DAY 30 MINUTES BEFORE FIRST AND LAST MEALS OF THE DAY CAPSULE, DELAYED RELEASE ORAL TWICE DAILY
Qty: 180 | Refills: 1 | Status: ACTIVE | COMMUNITY

## 2025-06-25 RX ORDER — MONTELUKAST SODIUM 10 MG/1
TAKE ONE TABLET BY MOUTH ONE TIME DAILY TABLET, COATED ORAL
Qty: 90 UNSPECIFIED | Refills: 0 | Status: ACTIVE | COMMUNITY

## 2025-06-25 RX ORDER — TETRACYCLINE HYDROCHLORIDE 500 MG/1
1 CAPSULE ON AN EMPTY STOMACH CAPSULE ORAL
Qty: 56 CAPSULE | Refills: 0 | OUTPATIENT
Start: 2025-06-25 | End: 2025-07-09

## 2025-06-25 RX ORDER — ONDANSETRON HYDROCHLORIDE 8 MG/1
1 TABLET AS NEEDED TABLET, FILM COATED ORAL
Qty: 90 | Refills: 1 | Status: ACTIVE | COMMUNITY
Start: 2021-12-22

## 2025-06-25 RX ORDER — ONDANSETRON 4 MG/1
1 TABLET ON THE TONGUE AND ALLOW TO DISSOLVE TABLET, ORALLY DISINTEGRATING ORAL
Qty: 5 TABLET | Refills: 0 | Status: ACTIVE | COMMUNITY
Start: 2024-09-10

## 2025-06-25 RX ORDER — LAMOTRIGINE 200 MG/1
TAKE ONE TABLET BY MOUTH ONE TIME DAILY TABLET ORAL
Qty: 90 UNSPECIFIED | Refills: 0 | Status: ACTIVE | COMMUNITY

## 2025-06-25 RX ORDER — ALBUTEROL SULFATE 2.5 MG/3ML
INHALE ONE VIAL VIA NEBULIZER EVERY 4 TO 6 HOURS AS NEEDED SOLUTION RESPIRATORY (INHALATION)
Qty: 90 UNSPECIFIED | Refills: 0 | Status: ACTIVE | COMMUNITY

## 2025-06-25 RX ORDER — BUSPIRONE HYDROCHLORIDE 15 MG/1
TAKE ONE TABLET BY MOUTH EVERY 6 TO 8 HOURS AS NEEDED FOR ANXIETY TABLET ORAL
Qty: 60 UNSPECIFIED | Refills: 1 | Status: ACTIVE | COMMUNITY

## 2025-06-25 RX ORDER — SUMATRIPTAN SUCCINATE 100 MG/1
TAKE ONE TABLET BY MOUTH AT ONSET OF HEADACHE. MAY REPEAT DOSE WITH ONE TABLET IN TWO HOURS IF NEEDED. DO NOT EXCEED TWO TABLETS IN 24 HOURS TABLET ORAL
Qty: 4 UNSPECIFIED | Refills: 3 | Status: ACTIVE | COMMUNITY

## 2025-06-25 RX ORDER — SCOPOLAMINE 1.5 MG/1
APPLY ONE PATCH TO THE SKIN BEHIND THE EAR EVERY 72 HOURS. REMOVE OLD PATCH BEFORE APPLYING NEW PATCH PATCH, EXTENDED RELEASE TRANSDERMAL
Qty: 10 UNSPECIFIED | Refills: 0 | Status: ACTIVE | COMMUNITY

## 2025-06-25 RX ORDER — OMEPRAZOLE 20 MG/1
1 CAPSULE 1/2 TO 1 HOUR BEFORE MEALS CAPSULE, DELAYED RELEASE ORAL TWICE A DAY
Qty: 28 | Refills: 0 | OUTPATIENT
Start: 2025-06-25

## 2025-06-25 RX ORDER — FLUOXETINE HYDROCHLORIDE 10 MG/1
TAKE ONE CAPSULE BY MOUTH ONE TIME DAILY FOR 30 DAYS CAPSULE ORAL
Qty: 30 UNSPECIFIED | Refills: 0 | Status: ACTIVE | COMMUNITY

## 2025-06-25 RX ORDER — HYDROXYZINE HYDROCHLORIDE 50 MG/1
TAKE ONE TABLET BY MOUTH THREE TIMES A DAY AS NEEDED FOR ITCHING OR ANXIETY TABLET ORAL
Qty: 90 UNSPECIFIED | Refills: 0 | Status: ACTIVE | COMMUNITY

## 2025-06-25 RX ORDER — METRONIDAZOLE 500 MG/1
1 TABLET TABLET ORAL
Qty: 56 | Refills: 0 | OUTPATIENT
Start: 2025-06-25 | End: 2025-07-09

## 2025-06-25 RX ORDER — AZELASTINE HYDROCHLORIDE 137 UG/1
SPRAY ONE SPRAY IN EACH NOSTRIL TWICE DAILY SPRAY, METERED NASAL
Qty: 30 UNSPECIFIED | Refills: 0 | Status: ACTIVE | COMMUNITY

## 2025-06-25 RX ORDER — POLYETHYLENE GLYCOL 3350 17 G/17G
1 PACKET MIXED WITH 8 OUNCES OF FLUID POWDER, FOR SOLUTION ORAL TWICE A DAY
Qty: 180 | Refills: 3 | Status: ON HOLD | COMMUNITY

## 2025-06-25 RX ORDER — RIZATRIPTAN BENZOATE 10 MG/1
TAKE ONE TABLET BY MOUTH ONE TIME DAILY AS NEEDED FOR FOR HEADACHE MAXIMUM 2 TABLETS DAILY TABLET ORAL
Qty: 9 UNSPECIFIED | Refills: 0 | Status: ACTIVE | COMMUNITY

## 2025-06-25 NOTE — HPI-TODAY'S VISIT:
This is a 44 year old female with PMH of gastric bypass (09/2018), asthma (no prior hospitalizations), GERD, and anxiety/dpression here for GI consult.   Here after having a negative PE and DVT workup at [facility] for leg edema.  Was found to have elevated tot bilirubin and was advised to see GI.  She admits to tea-colored urine as well.  Denies alcohol use and Tylenol use.  S/P cholecysectomy in 2001.  She tested positive for H Pylori last month. Was treated for it by her PCP.  States she recieved news this morning that her eradication test was still postive. Her symptoms include nausea, vomiting, and diarrhea.  Tried and failed Zofran. On Promethazine with her PCP 25 mg.  States she has about 5 loose stools a day.  She has had multiple EGDs in the past. Most recent one was in 2023 She had a revision to her gastric bypass this past December. States she was asympomtatic until this past March.  Had an EGD with her bariatric surgeon Dr. Sampson with bx. Will follow up with her for this.  Not having reflux symptoms.  She is no longer taking Ozempic. Following cardiology for lypmhedema and BL LE swelling of unknown etiology. On Furosemide.  Had labs done with her PCP 06/23/25 CMP Glu 78 BUN 11 Cr 0.91 GFR 79 BUN/Cr 12  K 3.9 Cl 104 CO2Ca 8.3 Pro 5.9 albumin 2.7 Glob 3.2 tot bili 1.9 alk phos 143 AST 20 AST 32  Iron < 131 UIBC < 17 Iron 114 iron sat > 87 Ferritin 445 H pylori positive 06/23/25  *Labs obtained from [facility]* Last colonoscopy was a while ago. Was scheduled for one last year but she canceled it. No FH of colon cancer.

## 2025-07-08 ENCOUNTER — LAB OUTSIDE AN ENCOUNTER (OUTPATIENT)
Dept: URBAN - METROPOLITAN AREA CLINIC 105 | Facility: CLINIC | Age: 45
End: 2025-07-08

## 2025-07-14 LAB — PANCREATICELASTASE ELISA, STOOL: (no result)

## 2025-07-29 ENCOUNTER — OFFICE VISIT (OUTPATIENT)
Dept: URBAN - METROPOLITAN AREA CLINIC 17 | Facility: CLINIC | Age: 45
End: 2025-07-29
Payer: COMMERCIAL

## 2025-07-29 DIAGNOSIS — K90.9 STEATORRHEA: ICD-10-CM

## 2025-07-29 DIAGNOSIS — R60.0 BILATERAL LOWER EXTREMITY EDEMA: ICD-10-CM

## 2025-07-29 DIAGNOSIS — R74.8 ELEVATED ALKALINE PHOSPHATASE LEVEL: ICD-10-CM

## 2025-07-29 DIAGNOSIS — R17 TOTAL BILIRUBIN, ELEVATED: ICD-10-CM

## 2025-07-29 DIAGNOSIS — A04.8 H. PYLORI INFECTION: ICD-10-CM

## 2025-07-29 DIAGNOSIS — E46 PROTEIN-CALORIE MALNUTRITION, UNSPECIFIED SEVERITY: ICD-10-CM

## 2025-07-29 DIAGNOSIS — Z98.84 HISTORY OF GASTRIC BYPASS: ICD-10-CM

## 2025-07-29 DIAGNOSIS — R11.2 NAUSEA AND VOMITING, UNSPECIFIED VOMITING TYPE: ICD-10-CM

## 2025-07-29 DIAGNOSIS — E16.2 HYPOGLYCEMIA, UNSPECIFIED: ICD-10-CM

## 2025-07-29 DIAGNOSIS — Z12.11 COLON CANCER SCREENING: ICD-10-CM

## 2025-07-29 DIAGNOSIS — R82.998 DARK BROWN URINE: ICD-10-CM

## 2025-07-29 PROCEDURE — 99214 OFFICE O/P EST MOD 30 MIN: CPT

## 2025-07-29 RX ORDER — ALBUTEROL SULFATE 2.5 MG/3ML
INHALE ONE VIAL VIA NEBULIZER EVERY 4 TO 6 HOURS AS NEEDED SOLUTION RESPIRATORY (INHALATION)
Qty: 90 UNSPECIFIED | Refills: 0 | Status: ACTIVE | COMMUNITY

## 2025-07-29 RX ORDER — BUSPIRONE HYDROCHLORIDE 15 MG/1
TAKE ONE TABLET BY MOUTH EVERY 6 TO 8 HOURS AS NEEDED FOR ANXIETY TABLET ORAL
Qty: 60 UNSPECIFIED | Refills: 1 | Status: ACTIVE | COMMUNITY

## 2025-07-29 RX ORDER — FLUOXETINE HYDROCHLORIDE 10 MG/1
TAKE ONE CAPSULE BY MOUTH ONE TIME DAILY FOR 30 DAYS CAPSULE ORAL
Qty: 30 UNSPECIFIED | Refills: 0 | Status: ACTIVE | COMMUNITY

## 2025-07-29 RX ORDER — TRAMADOL HYDROCHLORIDE 50 MG/1
TAKE ONE TABLET BY MOUTH EVERY 12 HOURS AS NEEDED FOR PAIN FOR 14 DAYS TABLET, COATED ORAL
Qty: 28 UNSPECIFIED | Refills: 0 | Status: ACTIVE | COMMUNITY

## 2025-07-29 RX ORDER — ONDANSETRON 4 MG/1
1 TABLET ON THE TONGUE AND ALLOW TO DISSOLVE TABLET, ORALLY DISINTEGRATING ORAL
Qty: 5 TABLET | Refills: 0 | Status: ACTIVE | COMMUNITY
Start: 2024-09-10

## 2025-07-29 RX ORDER — OMEPRAZOLE 20 MG/1
1 CAPSULE 1/2 TO 1 HOUR BEFORE MEALS CAPSULE, DELAYED RELEASE ORAL TWICE A DAY
Qty: 28 | Refills: 0 | Status: ACTIVE | COMMUNITY
Start: 2025-06-25

## 2025-07-29 RX ORDER — HYDROXYZINE HYDROCHLORIDE 50 MG/1
TAKE ONE TABLET BY MOUTH THREE TIMES A DAY AS NEEDED FOR ITCHING OR ANXIETY TABLET ORAL
Qty: 90 UNSPECIFIED | Refills: 0 | Status: ACTIVE | COMMUNITY

## 2025-07-29 RX ORDER — LAMOTRIGINE 200 MG/1
TAKE ONE TABLET BY MOUTH ONE TIME DAILY TABLET ORAL
Qty: 90 UNSPECIFIED | Refills: 0 | Status: ACTIVE | COMMUNITY

## 2025-07-29 RX ORDER — SUMATRIPTAN SUCCINATE 100 MG/1
TAKE ONE TABLET BY MOUTH AT ONSET OF HEADACHE. MAY REPEAT DOSE WITH ONE TABLET IN TWO HOURS IF NEEDED. DO NOT EXCEED TWO TABLETS IN 24 HOURS TABLET ORAL
Qty: 4 UNSPECIFIED | Refills: 3 | Status: ACTIVE | COMMUNITY

## 2025-07-29 RX ORDER — RIZATRIPTAN BENZOATE 10 MG/1
TAKE ONE TABLET BY MOUTH ONE TIME DAILY AS NEEDED FOR FOR HEADACHE MAXIMUM 2 TABLETS DAILY TABLET ORAL
Qty: 9 UNSPECIFIED | Refills: 0 | Status: ACTIVE | COMMUNITY

## 2025-07-29 RX ORDER — POLYETHYLENE GLYCOL 3350 17 G/17G
1 PACKET MIXED WITH 8 OUNCES OF FLUID POWDER, FOR SOLUTION ORAL TWICE A DAY
Qty: 180 | Refills: 3 | Status: ON HOLD | COMMUNITY

## 2025-07-29 RX ORDER — OMEPRAZOLE 40 MG/1
TAKE ONE CAPSULE BY MOUTH TWICE A DAY 30 MINUTES BEFORE FIRST AND LAST MEALS OF THE DAY CAPSULE, DELAYED RELEASE ORAL TWICE DAILY
Qty: 180 | Refills: 1 | Status: ACTIVE | COMMUNITY

## 2025-07-29 RX ORDER — AZELASTINE HYDROCHLORIDE 137 UG/1
SPRAY ONE SPRAY IN EACH NOSTRIL TWICE DAILY SPRAY, METERED NASAL
Qty: 30 UNSPECIFIED | Refills: 0 | Status: ACTIVE | COMMUNITY

## 2025-07-29 RX ORDER — SCOPOLAMINE 1.5 MG/1
APPLY ONE PATCH TO THE SKIN BEHIND THE EAR EVERY 72 HOURS. REMOVE OLD PATCH BEFORE APPLYING NEW PATCH PATCH, EXTENDED RELEASE TRANSDERMAL
Qty: 10 UNSPECIFIED | Refills: 0 | Status: ACTIVE | COMMUNITY

## 2025-07-29 RX ORDER — MONTELUKAST SODIUM 10 MG/1
TAKE ONE TABLET BY MOUTH ONE TIME DAILY TABLET, COATED ORAL
Qty: 90 UNSPECIFIED | Refills: 0 | Status: ACTIVE | COMMUNITY

## 2025-07-29 NOTE — HPI-TODAY'S VISIT:
06/25/25 This is a 44-year-old female with PMH of gastric bypass (09/2018), asthma (no prior hospitalizations), GERD, and anxiety/depression here for GI consult.   Here after having a negative PE and DVT workup at [facility] for leg edema.  Was found to have elevated tot bilirubin and was advised to see GI.  She admits to tea-colored urine as well.  Denies alcohol use and Tylenol use.  S/P cholecystectomy in 2001.  She tested positive for H Pylori last month. Was treated for it by her PCP.  States she received news this morning that her eradication test was still positive. Her symptoms include nausea, vomiting, and diarrhea.  Tried and failed Zofran. On Promethazine with her PCP 25 mg.  States she has about 5 loose stools a day.  She has had multiple EGDs in the past. Most recent one was in 2023 She had a revision to her gastric bypass this past December. States she was asymptomatic until this past March.  Had an EGD with her bariatric surgeon Dr. Sampson with bx. Will follow up with her for this.  Not having reflux symptoms.  She is no longer taking Ozempic. Following cardiology for lymphedema and BL LE swelling of unknown etiology. On Furosemide.  Had labs done with her PCP 06/23/25 CMP Glu 78 BUN 11 Cr 0.91 GFR 79 BUN/Cr 12  K 3.9 Cl 104 CO2Ca 8.3 Pro 5.9 albumin 2.7 Glob 3.2 tot bili 1.9 alk phos 143 AST 20 AST 32  Iron < 131 UIBC < 17 Iron 114 iron sat > 87 Ferritin 445 H pylori positive 06/23/25  *Labs obtained from [facility]* Last colonoscopy was a while ago. Was scheduled for one last year, but she canceled it. No FH of colon cancer.  06/25/25 Here for a follow up.  Having nausea, vomiting and diarrhea for the past few months.  We discussed her pancreatic elastase. Creon was not helpful.  Unable to tolerate PO oral intake Dr. Ramirez at [facility] bariatrics did and EGD and recommended she have SIBO testing done

## 2025-07-29 NOTE — PHYSICAL EXAM CONSTITUTIONAL:
well developed, well nourished , in acute distress , ambulating without difficulty , normal communication ability
Normal rate, regular rhythm.  Heart sounds S1, S2.

## 2025-08-01 ENCOUNTER — CLAIMS CREATED FROM THE CLAIM WINDOW (OUTPATIENT)
Dept: URBAN - METROPOLITAN AREA MEDICAL CENTER 16 | Facility: MEDICAL CENTER | Age: 45
End: 2025-08-01
Payer: COMMERCIAL

## 2025-08-01 DIAGNOSIS — Z98.84 PERSONAL HISTORY OF GASTRIC BYPASS: ICD-10-CM

## 2025-08-01 DIAGNOSIS — R11.2 ACUTE NAUSEA WITH NONBILIOUS VOMITING: ICD-10-CM

## 2025-08-01 PROCEDURE — 99232 SBSQ HOSP IP/OBS MODERATE 35: CPT | Performed by: INTERNAL MEDICINE
